# Patient Record
Sex: FEMALE | Race: BLACK OR AFRICAN AMERICAN
[De-identification: names, ages, dates, MRNs, and addresses within clinical notes are randomized per-mention and may not be internally consistent; named-entity substitution may affect disease eponyms.]

---

## 2020-03-19 ENCOUNTER — HOSPITAL ENCOUNTER (EMERGENCY)
Dept: HOSPITAL 41 - JD.ED | Age: 30
Discharge: HOME | End: 2020-03-19
Payer: COMMERCIAL

## 2020-03-19 VITALS — SYSTOLIC BLOOD PRESSURE: 129 MMHG | HEART RATE: 90 BPM | DIASTOLIC BLOOD PRESSURE: 89 MMHG

## 2020-03-19 DIAGNOSIS — J45.909: ICD-10-CM

## 2020-03-19 DIAGNOSIS — O99.89: Primary | ICD-10-CM

## 2020-03-19 DIAGNOSIS — Z79.899: ICD-10-CM

## 2020-03-19 DIAGNOSIS — Z3A.01: ICD-10-CM

## 2020-03-19 DIAGNOSIS — R10.2: ICD-10-CM

## 2020-03-19 DIAGNOSIS — M19.90: ICD-10-CM

## 2020-03-19 NOTE — US
First trimester obstetrical ultrasound: Multiple real-time images were

 obtained transvaginally.

 

Dates:

LMP: LMP given as 02/04/20, FLORINDA 11/10/20, gestational age 6 weeks 2 

days

Current ultrasound: FLORINDA 11/14/20, gestational age 5 weeks 5 days

 

Single intrauterine gestation is seen.  Amniotic fluid volume is 

normal.  Fetal pole and yolk sac are noted.  No subchorionic 

hemorrhage is seen.  Maternal ovaries are seen and appear normal.

 

Measurements:

Crown-rump length: 0.23 cm - 5 weeks 5 days

Heart rate: 106 BPM

 

Impression:

1.  Single intrauterine gestation.  Dates as noted above.

2.  No complicating process is identified by ultrasound at this time.

 

Diagnostic code #1

 

Study was dictated in MDT

## 2020-03-19 NOTE — EDM.PDOC
ED HPI GENERAL MEDICAL PROBLEM





- General


Chief Complaint: Abdominal Pain


Stated Complaint: 6WEEK PREG/ABDOMINAL/LEFT SIDE/BACK PAIN


Time Seen by Provider: 03/19/20 15:53


Source of Information: Reports: Patient


History Limitations: Reports: No Limitations





- History of Present Illness


INITIAL COMMENTS - FREE TEXT/NARRATIVE: 





Patient is a 29-year-old female who presents with suprapubic and bilateral 

lower abdominal pain that radiates into her back.  She states the symptoms have 

been present for a couple days and that they wax and wane.  She denies any 

abnormal vaginal discharge or vaginal bleeding.  Patient is currently 6 weeks 

pregnant after IVF.  She is G1, P0.  Last menstrual period was 2/4/2020.  She 

states that her bowel movements have been normal for her.  She normally goes 

every couple days and this has not changed.  Denies any dysuria.  She is had no 

nausea, vomiting, or diarrhea.  Denies any fever or chills.  Her IVF doctor was 

Dr. Singh out of [place].  She states she is supposed to have an ultrasound 

done next week and then a telehealth appointment with her.  After that time she 

will establish care with an OB/GYN in [place].


  ** Bilateral Lower Abdomen


Pain Score (Numeric/FACES): 7





- Related Data


 Allergies











Allergy/AdvReac Type Severity Reaction Status Date / Time


 


No Known Allergies Allergy   Verified 03/19/20 15:46











Home Meds: 


 Home Meds





Albuterol [IJP: Albuterol] 2.5 mg .XX Q4HR #25 unit 01/26/18 [Rx]


Mv-Mn/Iron/FA/Herbal/Digestive [Prenatal One Tablet] 1 tab PO DAILY 01/26/18 [

History]


Budesonide/Formoterol [Symbicort 160-4.5 MCG] 1 puff INH DAILY 03/19/20 [History

]


Estradiol [Joselin] 1 each TD ASDIRECTED 03/19/20 [History]


Ferrous Sulfate [Iron] 0 mg PO DAILY 03/19/20 [History]


proGESTerone [Progesterone] 50 mg IM DAILY 03/19/20 [History]











Past Medical History





- Past Health History


Medical/Surgical History: Denies Medical/Surgical History


HEENT History: Reports: None


Cardiovascular History: Reports: None


Respiratory History: Reports: Asthma


Gastrointestinal History: Reports: None


Genitourinary History: Reports: None


OB/GYN History: Reports: Pregnancy


Musculoskeletal History: Reports: Arthritis


Neurological History: Reports: Migraines


Psychiatric History: Reports: None


Endocrine/Metabolic History: Reports: None


Hematologic History: Reports: None


Immunologic History: Reports: None


Oncologic (Cancer) History: Reports: None


Dermatologic History: Reports: None





- Infectious Disease History


Infectious Disease History: Reports: None





Social & Family History





- Family History


Family Medical History: Noncontributory





- Tobacco Use


Smoking Status *Q: Never Smoker





- Caffeine Use


Caffeine Use: Reports: Soda





- Recreational Drug Use


Recreational Drug Use: No





ED ROS GENERAL





- Review of Systems


Review Of Systems: Comprehensive ROS is negative, except as noted in HPI.





ED EXAM PREGNANCY





- Physical Exam


Exam: See Below


Exam Limited By: No Limitations


General Appearance: Alert, WD/WN, No Apparent Distress


Respiratory/Chest: No Respiratory Distress, Lungs Clear, Normal Breath Sounds, 

No Accessory Muscle Use, Chest Non-Tender


Cardiovascular: Normal Peripheral Pulses, Regular Rate, Rhythm, No Edema, No 

Gallop, No JVD, No Murmur, No Rub


GI/Abdominal Exam: Normal Bowel Sounds, Soft, No Organomegaly, No Distention, 

No Abnormal Bruit, No Mass, Pelvis Stable, Tender (To bilateral lower quadrants 

and suprapubis)


Neurological: Alert, Oriented, CN II-XII Intact, Normal Cognition, Normal Gait, 

Normal Reflexes, No Motor/Sensory Deficits


Psychiatric: Normal Affect, Normal Mood


Skin Exam: Warm, Dry, Intact, Normal Color, No Rash





Course





- Vital Signs


Last Recorded V/S: 


 Last Vital Signs











Temp  97.2 F   03/19/20 15:42


 


Pulse  90   03/19/20 15:42


 


Resp  16   03/19/20 15:42


 


BP  129/89   03/19/20 15:42


 


Pulse Ox  98   03/19/20 15:42














- Orders/Labs/Meds


Labs: 


 Laboratory Tests











  03/19/20 03/19/20 03/19/20 Range/Units





  16:34 16:35 16:35 


 


WBC   7.35   (3.98-10.04)  K/mm3


 


RBC   5.04   (3.98-5.22)  M/mm3


 


Hgb   13.3   (11.2-15.7)  gm/dl


 


Hct   40.3   (34.1-44.9)  %


 


MCV   80.0   (79.4-94.8)  fl


 


MCH   26.4   (25.6-32.2)  pg


 


MCHC   33.0   (32.2-35.5)  g/dl


 


RDW Std Deviation   45.8   (36.4-46.3)  fL


 


Plt Count   433 H D   (182-369)  K/mm3


 


MPV   10.3   (9.4-12.3)  fl


 


Neut % (Auto)   57.8   (34.0-71.1)  %


 


Lymph % (Auto)   26.8   (19.3-51.7)  %


 


Mono % (Auto)   10.9   (4.7-12.5)  %


 


Eos % (Auto)   4.1   (0.7-5.8)  


 


Baso % (Auto)   0.4   (0.1-1.2)  %


 


Neut # (Auto)   4.25   (1.56-6.13)  K/mm3


 


Lymph # (Auto)   1.97   (1.18-3.74)  K/mm3


 


Mono # (Auto)   0.80 H   (0.24-0.36)  K/mm3


 


Eos # (Auto)   0.30   (0.04-0.36)  K/mm3


 


Baso # (Auto)   0.03   (0.01-0.08)  K/mm3


 


Sodium    141  (136-145)  mEq/L


 


Potassium    3.9  (3.5-5.1)  mEq/L


 


Chloride    107  ()  mEq/L


 


Carbon Dioxide    23  (21-32)  mEq/L


 


Anion Gap    14.9  (5-15)  


 


BUN    10  (7-18)  mg/dL


 


Creatinine    1.1 H  (0.55-1.02)  mg/dL


 


Est Cr Clr Drug Dosing    69.37  mL/min


 


Estimated GFR (MDRD)    > 60  (>60)  mL/min


 


BUN/Creatinine Ratio    9.1 L  (14-18)  


 


Glucose    99  ()  mg/dL


 


Calcium    9.4  (8.5-10.1)  mg/dL


 


Total Bilirubin    0.2  (0.2-1.0)  mg/dL


 


AST    8 L  (15-37)  U/L


 


ALT    17  (14-59)  U/L


 


Alkaline Phosphatase    61  ()  U/L


 


C-Reactive Protein    0.7  (<1.0)  mg/dL


 


Total Protein    7.2  (6.4-8.2)  g/dl


 


Albumin    3.4  (3.4-5.0)  g/dl


 


Globulin    3.8  gm/dL


 


Albumin/Globulin Ratio    0.9 L  (1-2)  


 


HCG, Quant    40350.0  mIU/mL


 


Urine Color  Yellow    (Yellow)  


 


Urine Appearance  Clear    (Clear)  


 


Urine pH  6.0    (5.0-8.0)  


 


Ur Specific Gravity  > or = 1.030    (1.005-1.030)  


 


Urine Protein  Negative    (Negative)  


 


Urine Glucose (UA)  Negative    (Negative)  


 


Urine Ketones  Negative    (Negative)  


 


Urine Occult Blood  Trace-intact H    (Negative)  


 


Urine Nitrite  Negative    (Negative)  


 


Urine Bilirubin  Negative    (Negative)  


 


Urine Urobilinogen  1.0    (0.2-1.0)  


 


Ur Leukocyte Esterase  Negative    (Negative)  


 


Urine RBC  5-10 H    (0-5)  /hpf


 


Urine WBC  0-5    (0-5)  /hpf


 


Ur Squamous Epith Cells  0-5    (0-5)  /hpf


 


Urine Bacteria  Few    (FEW)  /hpf


 


Urine Mucus  Few    (FEW)  /hpf














- Re-Assessments/Exams


Free Text/Narrative Re-Assessment/Exam: 





Hematology was grossly unremarkable.  hCG was elevated at 30,933 which would 

correlate with her dates.  Urinalysis was negative for any infection.  

Ultrasound was completed and shows a single, viable intrauterine gestation.  

Discussed with patient that her intermittent pain is likely related to the 

enlarging of the uterus and ligament strain.  Discharge instructions as 

documented.





Departure





- Departure


Time of Disposition: 17:53


Disposition: Home, Self-Care 01


Condition: Fair


Clinical Impression: 


 Pelvic pain during pregnancy in first trimester, antepartum








- Discharge Information


*PRESCRIPTION DRUG MONITORING PROGRAM REVIEWED*: No


*COPY OF PRESCRIPTION DRUG MONITORING REPORT IN PATIENT EMILI: No


Instructions:  Round Ligament Pain, Pelvic Pain, Female


Referrals: 


Kia Ndiaye PA-C [Primary Care Provider] - 


Yana Singh MD [Ordering Only Provider] - 


Forms:  ED Department Discharge


Additional Instructions: 


You were seen in the emergency department today for intermittent pelvic pain 

over the last few days.  Your work-up included blood work, urinalysis, and 

ultrasound of your uterus.  The results of these findings were all found to be 

normal based on your dates.  The ultrasound showed a single pregnancy in the 

uterus.  There is no signs of ectopic pregnancy.  Your urinalysis was negative 

for any signs of infection.  Your hCG was elevated and correlates with your 

dates.  As we discussed, the cause your pain could be related to the enlarging 

uterus and the surrounding ligaments and muscle tissue.  Would recommend that 

you ensure you have adequate fluid intake.  You may use Tylenol as needed for 

any discomfort.  Recommend that you call your fertility doctor tomorrow to 

discuss today's occurrences.  The ultrasound images have also been sent to 

[facility] in [place] so she should have access to these.  If you should 

experience any worsening symptoms or develop vaginal bleeding, we would 

recommend that you return to the emergency department.





Sepsis Event Note





- Evaluation


Sepsis Screening Result: No Definite Risk





- Focused Exam


Vital Signs: 


 Vital Signs











  Temp Pulse Resp BP Pulse Ox


 


 03/19/20 15:42  97.2 F  90  16  129/89  98











Date Exam was Performed: 03/19/20


Time Exam was Performed: 21:40

## 2020-11-08 ENCOUNTER — HOSPITAL ENCOUNTER (INPATIENT)
Dept: HOSPITAL 41 - JD.OB | Age: 30
LOS: 3 days | Discharge: HOME | DRG: 540 | End: 2020-11-11
Attending: OBSTETRICS & GYNECOLOGY | Admitting: OBSTETRICS & GYNECOLOGY
Payer: COMMERCIAL

## 2020-11-08 DIAGNOSIS — Z20.828: ICD-10-CM

## 2020-11-08 DIAGNOSIS — O35.8XX0: ICD-10-CM

## 2020-11-08 DIAGNOSIS — D57.3: ICD-10-CM

## 2020-11-08 DIAGNOSIS — Z3A.39: ICD-10-CM

## 2020-11-08 DIAGNOSIS — Z87.891: ICD-10-CM

## 2020-11-08 PROCEDURE — U0002 COVID-19 LAB TEST NON-CDC: HCPCS

## 2020-11-08 RX ADMIN — MISOPROSTOL PRN MCG: 100 TABLET ORAL at 16:33

## 2020-11-08 RX ADMIN — MISOPROSTOL PRN MCG: 100 TABLET ORAL at 12:20

## 2020-11-08 RX ADMIN — MISOPROSTOL PRN MCG: 100 TABLET ORAL at 08:24

## 2020-11-08 NOTE — PCM.PNLD
Labor Progress Note





- VS & Meds


Vital Signs: 


                                Last Vital Signs











Temp  36.8 C   11/08/20 07:27


 


Pulse  102 H  11/08/20 07:27


 


Resp  15   11/08/20 07:27


 


BP  122/84   11/08/20 07:27


 


Pulse Ox      











Active Medications: 


                               Current Medications





Ampicillin Sodium 2 gm/ Sodium (Chloride)  100 mls @ 200 mls/hr IV ONETIME ONE


   Stop: 11/08/20 23:29


Ampicillin Sodium 1 gm/ Sodium (Chloride)  100 mls @ 200 mls/hr IV Q4H SPENCER


Oxytocin/Lactated Ringer's (Pitocin In Lr 10 Units/1,000 Ml)  10 unit in 1,000 

mls @ 100 mls/hr IV .CONTINUOUS SPENCER


Lactated Ringer's (Ringers, Lactated)  1,000 mls @ 100 mls/hr IV ASDIRECTED SPENCER


Oxytocin/Lactated Ringer's (Pitocin In Lr 10 Units/1,000 Ml)  10 unit in 1,000 

mls @ 12 mls/hr IV TITRATE SPENCER; Protocol


Nalbuphine HCl (Nubain)  10 mg IVPUSH Q2H PRN


   PRN Reason: Pain


Ondansetron HCl (Zofran)  4 mg IVPUSH Q4H PRN


   PRN Reason: Nausea/Vomiting


Sodium Chloride (Saline Flush)  10 ml FLUSH ASDIRECTED PRN


   PRN Reason: Keep Vein Open





Discontinued Medications





Misoprostol (Cytotec)  25 mcg VAG Q4H PRN


   PRN Reason: cervical ripening


   Last Admin: 11/08/20 16:33 Dose:  25 mcg


   Documented by: 


Misoprostol (Cytotec) Confirm Administered Dose 25 mcg .ROUTE .STK-MED ONE


   Stop: 11/08/20 19:42











- Uterine Contractions


Uterine Monitoring Mode: External [place]


Contraction Intensity: Mild to Moderate


Uterine Resting Tone: Soft





- Fetal Monitoring


Fetal Monitor Mode: External Ultrasound


Fetal Heart Rate (FHR) Baseline: 135


Fetal Heart Rate (FHR) Variability: Moderate (6-25 bmp)


Fetal Accelerations: Present, 15x15


Fetal Decelerations: None


Fetal Strip Review: Category I





- Vaginal Exam


Dilation (cm): 2


Effacement (Percent): 50-60


Station: -2


Cervical Position: Midposition





- Labor Progress (Free Text)


Labor Progress: 





Doing well.  Has received 3 doses of cytotec.  Notes contractions as a 5/10.  

Just in last hour have become more regular.  Has made some nice change in 

cervix.  Would like to do another cytotec if possible, however will wait about 

45 minutes or so to see if current pattern lasts.  If does will start pitocin.  

If contractions fade will place cytotec.  Patient and wife agree.

## 2020-11-08 NOTE — PCM.LDHP
L&D History of Present Illness





- General


Date of Service: 20


Admit Problem/Dx: 


                   Patient Status Order with Admit Dx/Problem





20 07:27


Patient Status [ADT] Routine 








                           Admission Diagnosis/Problem











Admission Diagnosis/Problem    Normal pregnancy in third trimester














Source of Information: Patient


History Limitations: Reports: No Limitations





- History of Present Illness


Introduction:: 


Patient is a 30 y/o  at 39 2/7 wks who presents for IOL.  Done for fetal 

pyelectasis and IVF pregnancy per [facility] recommendations.  Doing well today.  No 

contractions or signs of labor.  





- Related Data


Allergies/Adverse Reactions: 


                                    Allergies











Allergy/AdvReac Type Severity Reaction Status Date / Time


 


No Known Allergies Allergy   Verified 20 15:46











Home Medications: 


                                    Home Meds





Albuterol [IJP: Albuterol] 2.5 mg .XX Q4HR #25 unit 18 [Rx]


Mv-Mn/Iron/FA/Herbal/Digestive [Prenatal One Tablet] 1 tab PO DAILY 18 

[History]


Budesonide/Formoterol [Symbicort 160-4.5 MCG] 1 puff INH DAILY 20 

[History]


Ferrous Sulfate [Iron] 0 mg PO DAILY 20 [History]


estradioL [Joselin] 1 each TD ASDIRECTED 20 [History]


proGESTerone [Progesterone] 50 mg IM DAILY 20 [History]











Past Medical History


Respiratory History: Reports: Asthma


OB/GYN History: Reports: Pregnancy


: 1


Para: 0


LMP (Approximate): Pregnant


Musculoskeletal History: Reports: Arthritis


Neurological History: Reports: Migraines


Hematologic History: Reports: Other (See Below) (Sickle cell trait)





- Past Surgical History


Other Surgical History Comment: No past surgical history





Social & Family History





- Family History


Family Medical History: Noncontributory





- Tobacco Use


Tobacco Use Status *Q: Former Tobacco User





- Caffeine Use


Caffeine Use: Reports: Soda





- Alcohol Use


Alcohol Use History: No





- Recreational Drug Use


Recreational Drug Use: No





H&P Review of Systems





- Review of Systems:


Review Of Systems: See Below


General: Reports: No Symptoms


Pulmonary: Reports: No Symptoms


Cardiovascular: Reports: No Symptoms


Gastrointestinal: Reports: No Symptoms


Genitourinary: Reports: No Symptoms


Musculoskeletal: Reports: No Symptoms


Psychiatric: Reports: No Symptoms


Neurological: Reports: No Symptoms





L&D Exam





- Exam


Exam: See Below





- OB Specific


Contraction Intensity: Irritability


Fetal Movement: Active


Fetal Heart Tones: Present


Fetal Heart Tones per Min: 130


Fetal Heart Rate (FHR) Variability: Moderate (6-25 bmp)


Birth Presentation: Vertex





- Cross Score


Cross Score Cervix Position: Posterior


Cross Score Consistency: Medium


Cross Score Effacement: 31-50%


Cross Score Dilation: 1-2 cm


Cross Score Infant's Station: -2


Cross Score Total: 4





- Exam


General: Alert, Oriented, Cooperative


Lungs: Clear to Auscultation, Normal Respiratory Effort


Cardiovascular: Regular Rate, Regular Rhythm


GI/Abdominal Exam: Soft, Non-Tender


Genitourinary: Normal external exam


Extremities: Normal Inspection


Skin: Warm, Dry, Intact





- Patient Data


Result Diagrams: 


                                 20 07:45








- Problem List


(1) 39 weeks gestation of pregnancy


SNOMED Code(s): 17775518


   ICD Code: Z3A.39 - 39 WEEKS GESTATION OF PREGNANCY   Status: Acute   Current 

Visit: Yes   





(2) Sickle cell trait


SNOMED Code(s): 06068710


   ICD Code: D57.3 - SICKLE-CELL TRAIT   Status: Acute   Current Visit: Yes   





(3) GBS (group B Streptococcus carrier), +RV culture, currently pregnant


SNOMED Code(s): 6143880402964, 119686571, 5819307591485


   ICD Code: O99.820 - STREPTOCOCCUS B CARRIER STATE COMPLICATING PREGNANCY   

Status: Acute   Current Visit: Yes   





(4) Fetal pyelectasis


SNOMED Code(s): 762689186


   ICD Code: ESL9293 -    Status: Acute   Current Visit: Yes   


Problem List Initiated/Reviewed/Updated: Yes


Orders Last 24hrs: 


                               Active Orders 24 hr











 Category Date Time Status


 


 Patient Status [ADT] Routine ADT  20 07:27 Ordered


 


 Activity as Tolerated [RC] PFP Care  20 07:27 Ordered


 


 Communication Order [RC] ASDIRECTED Care  20 07:27 Ordered


 


 Communication Order [RC] ASDIRECTED Care  20 07:30 Ordered


 


 Fetal Heart Tones [RC] ASDIRECTED Care  20 07:28 Ordered


 


 Fetal Monitoring [RC] INTERMITTENT Care  20 07:30 Ordered


 


 Fetal Non Stress Test [RC] PER UNIT ROUTINE Care  20 07:27 Ordered


 


 Notify Provider [RC] ASDIRECTED Care  20 07:30 Ordered


 


 Notify Provider [RC] PFP Care  20 07:27 Ordered


 


 Notify Provider [RC] PRN Care  20 07:27 Ordered


 


 Peripheral IV Care [RC] .AS DIRECTED Care  20 07:28 Ordered


 


 Vital Signs [RC] PER UNIT ROUTINE Care  20 07:27 Ordered


 


 Regular Diet [DIET] Diet  20 Breakfast Ordered


 


 CBC W/O DIFF,HEMOGRAM [HEME] Routine Lab  20 07:27 Ordered


 


 CORONAVIRUS COVID-19 MARGOT [MOLEC] Stat Lab  20 07:30 Ordered


 


 RAPID PLASMA REAGIN,RPR [CHEM] Routine Lab  20 07:27 Ordered


 


 TYPE AND SCREEN [BBK] Routine Lab  20 07:27 Ordered


 


 Ampicillin 1 gm Med  20 03:00 Ordered





 Sodium Chloride 0.9% [Normal Saline] 100 ml   





 IV Q4H   


 


 Ampicillin 2 gm Med  20 23:00 Ordered





 Sodium Chloride 0.9% [Normal Saline] 100 ml   





 IV ONETIME   


 


 Lactated Ringers [Ringers, Lactated] 1,000 ml Med  20 07:30 Ordered





 IV ASDIRECTED   


 


 Nalbuphine [Nubain] Med  20 07:27 Ordered





 10 mg IVPUSH Q2H PRN   


 


 Ondansetron [Zofran] Med  20 07:27 Ordered





 4 mg IVPUSH Q4H PRN   


 


 Oxytocin/Lactated Ringers [Pitocin in LR 10 Units/1,000 Med  20 07:30 

Ordered





 ML]   





 10 unit in 1,000 ml IV .CONTINUOUS   


 


 Oxytocin/Lactated Ringers [Pitocin in LR 10 Units/1,000 Med  20 07:30 

Ordered





 ML]   





 10 unit in 1,000 ml IV TITRATE   


 


 Sodium Chloride 0.9% [Saline Flush] Med  20 07:27 Ordered





 10 ml FLUSH ASDIRECTED PRN   


 


 miSOPROStoL [Cytotec] Med  20 07:30 Ordered





 25 mcg VAG Q4H PRN   


 


 Electronic Fetal Heart Tones Ext w TOCO [WOMSER] Oth  20 07:27 Ordered





 Routine   


 


 Electronic Fetal Heart Tones Internal [WOMSER] Per Unit Oth  20 07:27 

Ordered





 Routine   


 


 Medication Administration Instruction [OM.PC] Oth  20 07:45 Ordered





 ASDIRECTED   


 


 Peripheral IV Insertion Adult [OM.PC] Routine Oth  20 07:27 Ordered


 


 Resuscitation Status Routine Resus Stat  20 07:27 Ordered








                                Medication Orders





Ampicillin Sodium 2 gm/ Sodium (Chloride)  100 mls @ 200 mls/hr IV ONETIME ONE


   Stop: 20 23:29


Ampicillin Sodium 1 gm/ Sodium (Chloride)  100 mls @ 200 mls/hr IV Q4H SPENCER


Oxytocin/Lactated Ringer's (Pitocin In Lr 10 Units/1,000 Ml)  10 unit in 1,000 

mls @ 100 mls/hr IV .CONTINUOUS SPENCER


Lactated Ringer's (Ringers, Lactated)  1,000 mls @ 100 mls/hr IV ASDIRECTED SPENCER


Oxytocin/Lactated Ringer's (Pitocin In Lr 10 Units/1,000 Ml)  10 unit in 1,000 

mls @ 12 mls/hr IV TITRATE SPENCER; Protocol


Misoprostol (Cytotec)  25 mcg VAG Q4H PRN


   PRN Reason: cervical ripening


Nalbuphine HCl (Nubain)  10 mg IVPUSH Q2H PRN


   PRN Reason: Pain


Ondansetron HCl (Zofran)  4 mg IVPUSH Q4H PRN


   PRN Reason: Nausea/Vomiting


Sodium Chloride (Saline Flush)  10 ml FLUSH ASDIRECTED PRN


   PRN Reason: Keep Vein Open








Assessment/Plan Comment:: 





* Labs to be done


* Cytotec for IOL.  Will switch to pitocin/AROM when able.  


* GBS positive, will start antibiotics when in more active labor pattern 


* Pain management per patient preference 


* Anticipate 


* Will made Peds team aware of findings of prenatal fetal pyelectasis and 

  recommendations for renal US at 1-2 weeks of life

## 2020-11-09 PROCEDURE — 3E0P7VZ INTRODUCTION OF HORMONE INTO FEMALE REPRODUCTIVE, VIA NATURAL OR ARTIFICIAL OPENING: ICD-10-PCS | Performed by: OBSTETRICS & GYNECOLOGY

## 2020-11-09 PROCEDURE — 10H07YZ INSERTION OF OTHER DEVICE INTO PRODUCTS OF CONCEPTION, VIA NATURAL OR ARTIFICIAL OPENING: ICD-10-PCS | Performed by: OBSTETRICS & GYNECOLOGY

## 2020-11-09 RX ADMIN — KETOROLAC TROMETHAMINE SCH MG: 30 INJECTION, SOLUTION INTRAMUSCULAR at 12:31

## 2020-11-09 RX ADMIN — KETOROLAC TROMETHAMINE SCH MG: 30 INJECTION, SOLUTION INTRAMUSCULAR at 18:28

## 2020-11-09 NOTE — PCM.SN.2
- Free Text/Narrative


Note: 





0500


Last in to see patient around 0115.  Deceleration with maternal vomiting and 

SROM.  This resolved with position changes and discontinuation of pitocin which 

had been at 4.  Decision made to not restart and allow patient to continue to 

labor on own. 





Received call at 0416 that patient with some late decelerations since completion

of epidural.  Now becoming more frequent and deeper.  BP's have been normal and 

not required treatment.  Changes not responding to position changes.  Asked RN 

to put patient into hands/knees and then presented to L&D.  By my arrival 

decelerations had mostly stopped.  After being in that position for about 20 

minutes turned her back to supine and placed IUPC and FSE.  Positioned then in 

lateral tilt.  Reviewed with family potential that baby would not tolerate labor

process. Reviewed risks/benefits of  if needed.  They expressed 

understanding. 





Adamaris Ham MD

## 2020-11-09 NOTE — PCM.OPNOTE
- General Post-Op/Procedure Note


Date of Surgery/Procedure: 20


Operative Procedure(s): Primary low transverse 


Findings: 


Baby boy in a vertex presentation.  Weight of 6 lbs 9 oz.  APGARS of 9 & 9.  

Normal appearance of the uterus, fallopian tubes, and ovaries. 


Pre Op Diagnosis: 39 3/7 wks.  Fetal pyelectasis.  NRFS - recurrent late 

decelerations


Post-Op Diagnosis: Same


Anesthesia Technique: Epidural


Primary Surgeon: Adamaris Ham


Secondary Surgeon: Brandy Mckenna


Anesthesia Provider: Bud Messer


Reason Assistant Was Necessary: 


Speed, safety of procedure. 


Pathology: 


Cord blood collected.  Placenta discarded. 


Fluid Replacement, Intraop: 1,400


Output, Urine Amount: 75


EBL in mLs: 900


Complications: None


Condition: Good


Free Text/Narrative:: 





The risks, benefits, indications, potential complications, and alternatives were

explained to the patient and informed consent obtained.





After induction of anesthesia, the patient was placed in a supine position and 

then draped and prepped in the usual sterile manner.  A Pfannenstiel incision 

was made and carried down through the subcutaneous tissue to the fascia.  

Fascial incision was made and extended transversely.  The fascia was  

from the underlying rectus tissue superiorly and inferiorly.  The peritoneum was

identified and entered.  Peritoneal incision was extended longitudinally. The 

utero-vesical peritoneal reflection was incised transversely and the bladder 

flap was bluntly freed from the lower uterine segment.  A low transverse uterine

incision was made sharply with a scalpel and extended bluntly in a cephalocaudad

direction.  A baby boy was delivered from a vertex presentation with APGARS as 

above.  After the umbilical cord was clamped and cut cord blood was obtained for

evaluation.  The placenta was removed intact and appeared normal.  The uterus 

was exteriorized and cleared of clots.  The uterine outline, tubes and ovaries 

appeared normal.  The uterine incision was closed with running locked sutures of

0 Vicryl.  Hemostasis was obtained with a second imbricating layer of 0 Vicryl. 

The uterus was then placed back into the abdomen. The infracolic gutters were 

cleared of blood clots.  





The fascia was then reapproximated with running sutures of 0 Vicryl.  The 

subcutaneous tissue was irrigated with sterile warm normal saline, hemostasis 

obtained with cautery.  This layer was also closed with a running 0 Vicryl.  The

skin was reapproximated with running  Subcuticular 4-0 monocryl sutures.   

Instrument, sponge, and needle counts were correct prior the abdominal closure 

and at the conclusion of the case.

## 2020-11-09 NOTE — PCM.PREANE
Preanesthetic Assessment





- Procedure


Proposed Procedure: 





Epidural








- Anesthesia/Transfusion/Family Hx


Anesthesia History: Prior Anesthesia Without Reaction


Family History of Anesthesia Reaction: No


Transfusion History: No Prior Transfusion(s)





- Review of Systems


General: Fatigue


Pulmonary: No Symptoms


Cardiovascular: No Symptoms


Gastrointestinal: Abdominal Pain (labor)


Neurological: No Symptoms


Other: Reports: None





- Physical Assessment


Vital Signs: 





                                Last Vital Signs











Temp  36.8 C   11/08/20 07:27


 


Pulse  102 H  11/08/20 07:27


 


Resp  15   11/08/20 07:27


 


BP  122/84   11/08/20 07:27


 


Pulse Ox      











Height: 1.68 m


Weight: 82.1 kg


ASA Class: 2


Mental Status: Alert & Oriented x3


Airway Class: Mallampati = 1


Dentition: Reports: Normal Dentition


Thyro-Mental Finger Breadths: 3


Mouth Opening Finger Breadths: 3


ROM/Head Extension: Full


Lungs: Clear to Auscultation, Normal Respiratory Effort


Cardiovascular: Regular Rate, Regular Rhythm





- Lab


Values: 





                             Laboratory Last Values











WBC  8.95 K/mm3 (3.98-10.04)   11/08/20  07:45    


 


RBC  4.92 M/mm3 (3.98-5.22)   11/08/20  07:45    


 


Hgb  12.3 gm/dl (11.2-15.7)   11/08/20  07:45    


 


Hct  37.7 % (34.1-44.9)   11/08/20  07:45    


 


MCV  76.6 fl (79.4-94.8)  L D 11/08/20  07:45    


 


MCH  25.0 pg (25.6-32.2)  L  11/08/20  07:45    


 


MCHC  32.6 g/dl (32.2-35.5)   11/08/20  07:45    


 


RDW Std Deviation  39.9 fL (36.4-46.3)   11/08/20  07:45    


 


Plt Count  295 K/mm3 (182-369)  D 11/08/20  07:45    


 


MPV  12.7 fl (9.4-12.3)  H  11/08/20  07:45    


 


SARS-CoV-2 RNA (MARGOT)  Negative  (NEGATIVE)   11/08/20  07:45    


 


Blood Type  O POSITIVE   11/08/20  07:45    


 


Gel Antibody Screen  Negative   11/08/20  07:45    














- Allergies


Allergies/Adverse Reactions: 


                                    Allergies











Allergy/AdvReac Type Severity Reaction Status Date / Time


 


No Known Allergies Allergy   Verified 03/19/20 15:46














- Anesthesia Plan


Pre-Op Medication Ordered: None





- Acknowledgements


Anesthesia Type Planned: Epidural


Pt an Appropriate Candidate for the Planned Anesthesia: Yes


Alternatives and Risks of Anesthesia Discussed w Pt/Guardian: Yes


Pt/Guardian Understands and Agrees with Anesthesia Plan: Yes





PreAnesthesia Questionnaire





- Past Health History


Medical/Surgical History: Denies Medical/Surgical History


HEENT History: Reports: Allergic Rhinitis


Cardiovascular History: Reports: None


Respiratory History: Reports: Asthma


Gastrointestinal History: Reports: None, GERD


Genitourinary History: Reports: None


OB/GYN History: Reports: Pregnancy


Musculoskeletal History: Reports: Arthritis


Neurological History: Reports: Migraines


Psychiatric History: Reports: None


Endocrine/Metabolic History: Reports: None


Hematologic History: Reports: Other (See Below) (Sickle cell trait)


Immunologic History: Reports: None


Oncologic (Cancer) History: Reports: None


Dermatologic History: Reports: None





- Infectious Disease History


Infectious Disease History: Reports: None





- Past Surgical History


Other Surgical History Comment: No past surgical history





- SUBSTANCE USE


Tobacco Use Status *Q: Former Tobacco User


Tobacco Use Within Last Twelve Months: Cigarettes


Second Hand Smoke Exposure: No


Recreational Drug Use History: No





- HOME MEDS


Home Medications: 


                                    Home Meds





Albuterol [IJP: Albuterol] 2.5 mg .XX Q4HR #25 unit 01/26/18 [Rx]


Budesonide/Formoterol [Symbicort 160-4.5 MCG] 1 puff INH DAILY 03/19/20 

[History]


Magnesium 200 mg PO DAILY 11/08/20 [History]


Montelukast [Singulair] 10 mg PO DAILY 11/08/20 [History]


Omeprazole 20 mg PO DAILY 11/08/20 [History]


Prenatal #103/Iron Fumarate/Fa [ Prenatal] 1 tab PO DAILY 11/08/20 

[History]


Vit D3 & K/Berberine HCl/Hops [Ostera] 1 each PO DAILY 11/08/20 [History]











- CURRENT (IN HOUSE) MEDS


Current Meds: 





                               Current Medications





Diphenhydramine HCl (Benadryl)  25 mg IVPUSH Q6H PRN


   PRN Reason: pruritis


Ephedrine Sulfate (Ephedrine Sulfate)  5 mg IVPUSH ASDIRECTED PRN


   PRN Reason: Hypotension


Fentanyl (Sublimaze)  100 mcg EPIDUR Q3H PRN


   PRN Reason: Pain


Fentanyl/Bupivacaine HCl (Fentanyl/Bupivacaine/Ns 2 Mcg-0.125% 100 Ml)  100 ml 

EPIDUR ASDIRECTED PRN


   PRN Reason: Pain


Ampicillin Sodium 1 gm/ Sodium (Chloride)  100 mls @ 200 mls/hr IV Q4H [facility]


Oxytocin/Lactated Ringer's (Pitocin In Lr 10 Units/1,000 Ml)  10 unit in 1,000 

mls @ 100 mls/hr IV .CONTINUOUS SPENCER


Lactated Ringer's (Ringers, Lactated)  1,000 mls @ 100 mls/hr IV ASDIRECTED SP[facility]ER


   Last Admin: 11/09/20 02:15 Dose:  999 mls/hr


   Documented by: 


Oxytocin/Lactated Ringer's (Pitocin In Lr 10 Units/1,000 Ml)  10 unit in 1,000 

mls @ 12 mls/hr IV TITRATE SPENCER; Protocol


   Last Titration: 11/09/20 00:05 Dose:  4 munits/min, 24 mls/hr


   Documented by: 


Nalbuphine HCl (Nubain)  10 mg IVPUSH Q2H PRN


   PRN Reason: Pain


Ondansetron HCl (Zofran)  4 mg IVPUSH Q4H PRN


   PRN Reason: Nausea/Vomiting


Sodium Chloride (Saline Flush)  10 ml FLUSH ASDIRECTED PRN


   PRN Reason: Keep Vein Open





Discontinued Medications





Fentanyl (Sublimaze) Confirm Administered Dose 100 mcg .ROUTE .STK-MED ONE


   Stop: 11/09/20 02:19


Ampicillin Sodium 2 gm/ Sodium (Chloride)  100 mls @ 200 mls/hr IV ONETIME ONE


   Stop: 11/08/20 23:29


   Last Admin: 11/08/20 23:19 Dose:  200 mls/hr


   Documented by: 


Misoprostol (Cytotec)  25 mcg VAG Q4H PRN


   PRN Reason: cervical ripening


   Last Admin: 11/08/20 16:33 Dose:  25 mcg


   Documented by: 


Misoprostol (Cytotec) Confirm Administered Dose 25 mcg .ROUTE .STK-MED ONE


   Stop: 11/08/20 19:42


   Last Admin: 11/08/20 23:18 Dose:  Not Given


   Documented by:

## 2020-11-10 RX ADMIN — OXYCODONE HYDROCHLORIDE AND ACETAMINOPHEN PRN TAB: 5; 325 TABLET ORAL at 23:31

## 2020-11-10 RX ADMIN — OXYCODONE HYDROCHLORIDE AND ACETAMINOPHEN PRN TAB: 5; 325 TABLET ORAL at 14:51

## 2020-11-10 RX ADMIN — KETOROLAC TROMETHAMINE SCH MG: 30 INJECTION, SOLUTION INTRAMUSCULAR at 01:48

## 2020-11-10 NOTE — PCM.PNPP
- General Info


Date of Service: 11/10/20


Functional Status: Reports: Pain Controlled, Tolerating Diet, Ambulating, 

Urinating





- Review of Systems


General: Reports: No Symptoms


Pulmonary: Reports: No Symptoms


Cardiovascular: Reports: No Symptoms


Gastrointestinal: Reports: Abdominal Pain (managed)


Genitourinary: Reports: No Symptoms


Musculoskeletal: Reports: No Symptoms


Neurological: Reports: No Symptoms





- Patient Data


Vital Signs - Most Recent: 


                                Last Vital Signs











Temp  36.7 C   11/10/20 03:50


 


Pulse  91   11/10/20 03:50


 


Resp  14   11/10/20 03:50


 


BP  128/76   11/10/20 03:50


 


Pulse Ox  99   11/10/20 03:50











Weight - Most Recent: 82.1 kg


I&O - Last 24 Hours: 


                                 Intake & Output











 11/09/20 11/09/20 11/10/20





 14:59 22:59 06:59


 


Intake Total 7220  


 


Output Total 1325 1700 1000


 


Balance 5895 -1700 -1000











Lab Results - Last 24 Hours: 


                         Laboratory Results - last 24 hr











  20 Range/Units





  07:45 


 


RPR  Non-reactive  (NONREACTIVE)  











Med Orders - Current: 


                               Current Medications





Diphenhydramine HCl (Benadryl)  25 mg IVPUSH Q6H PRN


   PRN Reason: Itching or Nausea


Docusate Sodium (Colace)  100 mg PO Q12H PRN


   PRN Reason: Constipation


Ibuprofen (Motrin)  600 mg PO Q6H PRN


   PRN Reason: mild pain or fever


Ondansetron HCl (Zofran)  4 mg IV Q8H PRN


   PRN Reason: Nausea/Vomiting


   Last Admin: 20 11:52 Dose:  4 mg


   Documented by: 


Oxycodone/Acetaminophen (Percocet 325-5 Mg)  1 tab PO Q4H PRN


   PRN Reason: Pain (moderate 4-6)


Oxycodone/Acetaminophen (Percocet 325-5 Mg)  2 tab PO Q4H PRN


   PRN Reason: Pain (severe 7-10)





Discontinued Medications





Bupivacaine HCl (Sensorcaine-Mpf 0.25%)  10 ml .ROUTE .STK-MED ONE


   Stop: 20 08:01


Cefazolin Sodium (Ancef) Confirm Administered Dose 2 gm .ROUTE .STK-MED ONE


   Stop: 20 05:52


Citric Acid/Sodium Citrate (Bicitra Solution)  30 ml PO ONETIME ONE


   Stop: 20 05:17


   Last Admin: 20 05:27 Dose:  30 ml


   Documented by: 


Citric Acid/Sodium Citrate (Bicitra Solution) Confirm Administered Dose 30 ml 

.ROUTE .STK-MED ONE


   Stop: 20 05:22


   Last Admin: 20 05:27 Dose:  Not Given


   Documented by: 


Diphenhydramine HCl (Benadryl)  25 mg IVPUSH Q6H PRN


   PRN Reason: pruritis


Ephedrine Sulfate (Ephedrine Sulfate)  5 mg IVPUSH ASDIRECTED PRN


   PRN Reason: Hypotension


Fentanyl (Sublimaze)  100 mcg EPIDUR Q3H PRN


   PRN Reason: Pain


   Last Admin: 20 03:29 Dose:  100 mcg


   Documented by: 


Fentanyl (Sublimaze) Confirm Administered Dose 100 mcg .ROUTE .STK-MED ONE


   Stop: 20 02:19


   Last Admin: 20 03:29 Dose:  Not Given


   Documented by: 


Fentanyl/Bupivacaine HCl (Fentanyl/Bupivacaine/Ns 2 Mcg-0.125% 100 Ml)  100 ml 

EPIDUR ASDIRECTED PRN


   PRN Reason: Pain


   Last Admin: 20 03:29 Dose:  100 ml


   Documented by: 


Ampicillin Sodium 2 gm/ Sodium (Chloride)  100 mls @ 200 mls/hr IV ONETIME ONE


   Stop: 20 23:29


   Last Admin: 20 23:19 Dose:  200 mls/hr


   Documented by: 


Ampicillin Sodium 1 gm/ Sodium (Chloride)  100 mls @ 200 mls/hr IV Q4H [facility]


   Last Admin: 20 03:28 Dose:  200 mls/hr


   Documented by: 


Oxytocin/Lactated Ringer's (Pitocin In Lr 10 Units/1,000 Ml)  10 unit in 1,000 

mls @ 100 mls/hr IV .CONTINUOUS SPENCER


Lactated Ringer's (Ringers, Lactated)  1,000 mls @ 100 mls/hr IV ASDIRECTED SPENCER


   Last Admin: 20 05:05 Dose:  100 mls/hr


   Documented by: 


Oxytocin/Lactated Ringer's (Pitocin In Lr 10 Units/1,000 Ml)  10 unit in 1,000 

mls @ 12 mls/hr IV TITRATE SPENCER; Protocol


   Last Titration: 20 01:17 Dose:  0 munits/min, 0 mls/hr


   Documented by: 


Cefazolin Sodium/Dextrose 2 gm (/ Premix)  50 mls @ 100 mls/hr IV ONETIME ONE


   Stop: 20 05:45


Azithromycin 500 mg/ Sodium (Chloride)  250 mls @ 250 mls/hr IV ONETIME ONE


   Stop: 20 06:15


   Last Admin: 20 05:30 Dose:  250 mls/hr


   Documented by: 


Lactated Ringer's (Ringers, Lactated) Confirm Administered Dose 1,000 mls @ as 

directed .ROUTE .STK-MED ONE


   Stop: 20 05:38


Dextrose/Lactated Ringer's (Dextrose 5%-Lactated Ringers)  1,000 mls @ 125 

mls/hr IV ASDIRECTED SPENCER


   Stop: 20 16:40


   Last Admin: 20 09:20 Dose:  125 mls/hr


   Documented by: 


Ketorolac Tromethamine (Toradol) Confirm Administered Dose 30 mg .ROUTE .STK-MED

ONE


   Stop: 20 06:11


Ketorolac Tromethamine (Toradol)  30 mg IVPUSH Q6H [facility]


   Stop: 11/10/20 00:31


   Last Admin: 11/10/20 01:48 Dose:  30 mg


   Documented by: 


Lidocaine/Epinephrine (Xylocaine-Mpf 2%-Epi 1:200,000) Confirm Administered Dose

20 ml .ROUTE .STK-MED ONE


   Stop: 20 05:37


Metoclopramide HCl (Reglan)  10 mg IVPUSH ONETIME ONE


   Stop: 20 05:17


   Last Admin: 20 05:27 Dose:  10 mg


   Documented by: 


Metoclopramide HCl (Reglan) Confirm Administered Dose 10 mg .ROUTE .STK-MED ONE


   Stop: 20 05:22


   Last Admin: 20 05:27 Dose:  Not Given


   Documented by: 


Miscellaneous Medication (Phenylephrine 1 Mg/10 Ml-Ns) Confirm Administered Dose

1 mg .ROUTE .STK-MED ONE


   Stop: 20 06:07


Misoprostol (Cytotec)  25 mcg VAG Q4H PRN


   PRN Reason: cervical ripening


   Last Admin: 20 16:33 Dose:  25 mcg


   Documented by: 


Misoprostol (Cytotec) Confirm Administered Dose 25 mcg .ROUTE .STK-MED ONE


   Stop: 20 19:42


   Last Admin: 20 23:18 Dose:  Not Given


   Documented by: 


Morphine Sulfate (Duramorph Pf) Confirm Administered Dose 10 mg .ROUTE .STK-MED 

ONE


   Stop: 20 06:13


Nalbuphine HCl (Nubain)  10 mg IVPUSH Q2H PRN


   PRN Reason: Pain


Ondansetron HCl (Zofran)  4 mg IVPUSH Q4H PRN


   PRN Reason: Nausea/Vomiting


Oxytocin (Pitocin) Confirm Administered Dose 10 unit .ROUTE .STK-MED ONE


   Stop: 20 06:04


Sodium Chloride (Saline Flush)  10 ml FLUSH ASDIRECTED PRN


   PRN Reason: Keep Vein Open


   Last Admin: 20 18:30 Dose:  10 ml


   Documented by: 











- Infant Interaction


Infant Disposition, Postpartum:  in Room with Family


Infant Interaction: Holding Infant


Infant Feeding:  Infant; Nursed Well


Support Person: Other (see below)





- Postpartum Recovery Exam


Fundal Tone: Firm


Fundal Level: 1 Fingerbreadths Below Umbilicus


Fundal Placement: Midline


Lochia Amount: Scant


Lochia Color: Rubra/Red


Perineum Description: Intact, Minimal Bruising/Swelling


Episiotomy/Laceration: None


Bladder Status: Voiding


Urinary Elimination: Voided





- Exam


General: Alert


Physical Findings Comment:: 





Exam to be done later.  Patient currently breast feeding 





- Problem List & Annotations


(1) 39 weeks gestation of pregnancy


SNOMED Code(s): 49826906


   Code(s): Z3A.39 - 39 WEEKS GESTATION OF PREGNANCY   Status: Acute   Current 

Visit: Yes   





(2) Sickle cell trait


SNOMED Code(s): 10946065


   Code(s): D57.3 - SICKLE-CELL TRAIT   Status: Acute   Current Visit: Yes   





(3) GBS (group B Streptococcus carrier), +RV culture, currently pregnant


SNOMED Code(s): 6924933121623, 353664573, 6434418840654


   Code(s): O99.820 - STREPTOCOCCUS B CARRIER STATE COMPLICATING PREGNANCY   

Status: Acute   Current Visit: Yes   





(4) Fetal pyelectasis


SNOMED Code(s): 972432104


   Code(s): NKN8921 -    Status: Acute   Current Visit: Yes   





(5) Non-reassuring fetal status


SNOMED Code(s): 722818719


   Code(s): SRG1169 -    Status: Acute   Current Visit: Yes   





(6) S/P primary low transverse 


SNOMED Code(s): 550640792, 97381300, 991661056, 130403939, 890736669


   Code(s): Z98.891 - HISTORY OF UTERINE SCAR FROM PREVIOUS SURGERY   Status: 

Acute   Current Visit: Yes   





- Problem List Review


Problem List Initiated/Reviewed/Updated: Yes





- My Orders


Last 24 Hours: 


My Active Orders





20 Breakfast


Regular Diet [DIET] 





20 08:41


Acetaminophen/oxyCODONE [Percocet 325-5 MG]   1 tab PO Q4H PRN 


Acetaminophen/oxyCODONE [Percocet 325-5 MG]   2 tab PO Q4H PRN 


Docusate Sodium [Colace]   100 mg PO Q12H PRN 


Ondansetron [Zofran]   4 mg IV Q8H PRN 


diphenhydrAMINE [Benadryl]   25 mg IVPUSH Q6H PRN 





20 08:41


Antiembolic Devices [RC] QSHIFT 


Communication Order [RC] PER UNIT ROUTINE 


Intake and Output [RC] ASDIRECTED 


May Shower [RC] PER UNIT ROUTINE 


Notify Provider Intake and Out [RC] ASDIRECTED 


RT Incentive Spirometry [RC] Q2HWA 


Vital Signs [RC] 03,09,15,21 


Assess Lochia [WOMSER] Per Unit Routine 


Assess Uterine Involution [WOMSER] Per Unit Routine 


Breast Pump [WOMSER] Routine 


Heat Therapy [OM.PC] Per Unit Routine 


Peripheral IV Discontinue [OM.PC] Routine 


Sequential Compression Device [OM.PC] Per Unit Routine 





11/10/20 05:11


CBC W/O DIFF,HEMOGRAM [HEME] AM 





11/10/20 06:30


Ibuprofen [Motrin]   600 mg PO Q6H PRN 














- Assessment


Assessment:: 





POD#1





- Plan


Plan:: 





* Routine care


* Breast feeding


* Discharge home in 1-2 days

## 2020-11-10 NOTE — [id number]
Post Anesthesia Note





- EVALUATION WITHIN 48HRS OF ANESTHETIC


Vital Signs in Normal Range: Yes


Patient Participated in Evaluation: Yes


Respiratory Function Stable: Yes


Airway Patent: Yes


Cardiovascular Function Stable: Yes


Hydration Status Stable: Yes


Pain Control Satisfactory: Yes


Nausea and Vomiting Control Satisfactory: Yes


Mental Status Recovered: Yes


Vital Signs: 


                                Last Vital Signs











Temp  36.7 C   11/10/20 03:50


 


Pulse  91   11/10/20 03:50


 


Resp  14   11/10/20 03:50


 


BP  128/76   11/10/20 03:50


 


Pulse Ox  99   11/10/20 03:50

## 2020-11-11 VITALS — HEART RATE: 76 BPM | SYSTOLIC BLOOD PRESSURE: 118 MMHG | DIASTOLIC BLOOD PRESSURE: 68 MMHG

## 2020-11-11 RX ADMIN — OXYCODONE HYDROCHLORIDE AND ACETAMINOPHEN PRN TAB: 5; 325 TABLET ORAL at 09:34

## 2020-11-11 RX ADMIN — OXYCODONE HYDROCHLORIDE AND ACETAMINOPHEN PRN TAB: 5; 325 TABLET ORAL at 05:20

## 2020-11-11 NOTE — PCM.PNPP
- General Info


Date of Service: 20


Functional Status: Reports: Pain Controlled, Tolerating Diet, Ambulating, 

Urinating





- Review of Systems


General: Reports: No Symptoms


Pulmonary: Reports: No Symptoms


Cardiovascular: Reports: No Symptoms


Gastrointestinal: Reports: Abdominal Pain (managed with medications )


Genitourinary: Reports: No Symptoms


Musculoskeletal: Reports: No Symptoms


Neurological: Reports: No Symptoms





- Patient Data


Vital Signs - Most Recent: 


                                Last Vital Signs











Temp  36.1 C   20 03:27


 


Pulse  83   20 03:27


 


Resp  16   20 03:27


 


BP  133/73   20 03:27


 


Pulse Ox  100   20 03:27











Weight - Most Recent: 82.1 kg


I&O - Last 24 Hours: 


                                 Intake & Output











 11/10/20 11/10/20 11/11/20





 14:59 22:59 06:59


 


Intake Total 180  


 


Output Total 300  


 


Balance -120  











Lab Results - Last 24 Hours: 


                         Laboratory Results - last 24 hr











  11/10/20 Range/Units





  07:28 


 


WBC  10.33 H  (3.98-10.04)  K/mm3


 


RBC  4.17  (3.98-5.22)  M/mm3


 


Hgb  10.3 L D  (11.2-15.7)  gm/dl


 


Hct  32.4 L  (34.1-44.9)  %


 


MCV  77.7 L  (79.4-94.8)  fl


 


MCH  24.7 L  (25.6-32.2)  pg


 


MCHC  31.8 L  (32.2-35.5)  g/dl


 


RDW Std Deviation  40.3  (36.4-46.3)  fL


 


Plt Count  256  (182-369)  K/mm3


 


MPV  12.1  (9.4-12.3)  fl











Med Orders - Current: 


                               Current Medications





Diphenhydramine HCl (Benadryl)  25 mg IVPUSH Q6H PRN


   PRN Reason: Itching or Nausea


Docusate Sodium (Colace)  100 mg PO Q12H PRN


   PRN Reason: Constipation


   Last Admin: 11/10/20 22:04 Dose:  100 mg


   Documented by: 


Ibuprofen (Motrin)  600 mg PO Q6H PRN


   PRN Reason: mild pain or fever


   Last Admin: 20 00:49 Dose:  600 mg


   Documented by: 


Ondansetron HCl (Zofran)  4 mg IV Q8H PRN


   PRN Reason: Nausea/Vomiting


   Last Admin: 20 11:52 Dose:  4 mg


   Documented by: 


Oxycodone/Acetaminophen (Percocet 325-5 Mg)  1 tab PO Q4H PRN


   PRN Reason: Pain (moderate 4-6)


   Last Admin: 11/10/20 10:21 Dose:  1 tab


   Documented by: 


Oxycodone/Acetaminophen (Percocet 325-5 Mg)  2 tab PO Q4H PRN


   PRN Reason: Pain (severe 7-10)


   Last Admin: 20 05:20 Dose:  2 tab


   Documented by: 


Simethicone (Simethicone)  160 mg PO Q4H PRN


   PRN Reason: gas distention


   Last Admin: 11/10/20 22:04 Dose:  160 mg


   Documented by: 





Discontinued Medications





Bupivacaine HCl (Sensorcaine-Mpf 0.25%)  10 ml .ROUTE .STK-MED ONE


   Stop: 20 08:01


Cefazolin Sodium (Ancef) Confirm Administered Dose 2 gm .ROUTE .STK-MED ONE


   Stop: 20 05:52


Citric Acid/Sodium Citrate (Bicitra Solution)  30 ml PO ONETIME ONE


   Stop: 20 05:17


   Last Admin: 20 05:27 Dose:  30 ml


   Documented by: 


Citric Acid/Sodium Citrate (Bicitra Solution) Confirm Administered Dose 30 ml 

.ROUTE .STK-MED ONE


   Stop: 20 05:22


   Last Admin: 20 05:27 Dose:  Not Given


   Documented by: 


Diphenhydramine HCl (Benadryl)  25 mg IVPUSH Q6H PRN


   PRN Reason: pruritis


Ephedrine Sulfate (Ephedrine Sulfate)  5 mg IVPUSH ASDIRECTED PRN


   PRN Reason: Hypotension


Fentanyl (Sublimaze)  100 mcg EPIDUR Q3H PRN


   PRN Reason: Pain


   Last Admin: 20 03:29 Dose:  100 mcg


   Documented by: 


Fentanyl (Sublimaze) Confirm Administered Dose 100 mcg .ROUTE .STK-MED ONE


   Stop: 20 02:19


   Last Admin: 20 03:29 Dose:  Not Given


   Documented by: 


Fentanyl/Bupivacaine HCl (Fentanyl/Bupivacaine/Ns 2 Mcg-0.125% 100 Ml)  100 ml 

EPIDUR ASDIRECTED PRN


   PRN Reason: Pain


   Last Admin: 20 03:29 Dose:  100 ml


   Documented by: 


Ampicillin Sodium 2 gm/ Sodium (Chloride)  100 mls @ 200 mls/hr IV ONETIME ONE


   Stop: 20 23:29


   Last Admin: 20 23:19 Dose:  200 mls/hr


   Documented by: 


Ampicillin Sodium 1 gm/ Sodium (Chloride)  100 mls @ 200 mls/hr IV Q4H [facility]


   Last Admin: 11/10/20 07:39 Dose:  Not Given


   Documented by: 


Oxytocin/Lactated Ringer's (Pitocin In Lr 10 Units/1,000 Ml)  10 unit in 1,000 

mls @ 100 mls/hr IV .CONTINUOUS [facility]


Lactated Ringer's (Ringers, Lactated)  1,000 mls @ 100 mls/hr IV ASDIRECTED [facility]


   Last Admin: 20 05:05 Dose:  100 mls/hr


   Documented by: 


Oxytocin/Lactated Ringer's (Pitocin In Lr 10 Units/1,000 Ml)  10 unit in 1,000 

mls @ 12 mls/hr IV TITRATE [facility]; Protocol


   Last Titration: 20 01:17 Dose:  0 munits/min, 0 mls/hr


   Documented by: 


Cefazolin Sodium/Dextrose 2 gm (/ Premix)  50 mls @ 100 mls/hr IV ONETIME ONE


   Stop: 20 05:45


   Last Admin: 11/10/20 07:39 Dose:  Not Given


   Documented by: 


Azithromycin 500 mg/ Sodium (Chloride)  250 mls @ 250 mls/hr IV ONETIME ONE


   Stop: 20 06:15


   Last Admin: 20 05:30 Dose:  250 mls/hr


   Documented by: 


Lactated Ringer's (Ringers, Lactated) Confirm Administered Dose 1,000 mls @ as 

directed .ROUTE .STK-MED ONE


   Stop: 20 05:38


Dextrose/Lactated Ringer's (Dextrose 5%-Lactated Ringers)  1,000 mls @ 125 

mls/hr IV ASDIRECTED [facility]


   Stop: 20 16:40


   Last Admin: 20 09:20 Dose:  125 mls/hr


   Documented by: 


Ketorolac Tromethamine (Toradol) Confirm Administered Dose 30 mg .ROUTE .STK-MED

ONE


   Stop: 20 06:11


Ketorolac Tromethamine (Toradol)  30 mg IVPUSH Q6H [facility]


   Stop: 11/10/20 00:31


   Last Admin: 11/10/20 01:48 Dose:  30 mg


   Documented by: 


Lidocaine/Epinephrine (Xylocaine-Mpf 2%-Epi 1:200,000) Confirm Administered Dose

20 ml .ROUTE .STK-MED ONE


   Stop: 20 05:37


Metoclopramide HCl (Reglan)  10 mg IVPUSH ONETIME ONE


   Stop: 20 05:17


   Last Admin: 20 05:27 Dose:  10 mg


   Documented by: 


Metoclopramide HCl (Reglan) Confirm Administered Dose 10 mg .ROUTE .STK-MED ONE


   Stop: 20 05:22


   Last Admin: 20 05:27 Dose:  Not Given


   Documented by: 


Miscellaneous Medication (Phenylephrine 1 Mg/10 Ml-Ns) Confirm Administered Dose

1 mg .ROUTE .STK-MED ONE


   Stop: 20 06:07


Misoprostol (Cytotec)  25 mcg VAG Q4H PRN


   PRN Reason: cervical ripening


   Last Admin: 20 16:33 Dose:  25 mcg


   Documented by: 


Misoprostol (Cytotec) Confirm Administered Dose 25 mcg .ROUTE .STK-MED ONE


   Stop: 20 19:42


   Last Admin: 20 23:18 Dose:  Not Given


   Documented by: 


Morphine Sulfate (Duramorph Pf) Confirm Administered Dose 10 mg .ROUTE .STK-MED 

ONE


   Stop: 20 06:13


Nalbuphine HCl (Nubain)  10 mg IVPUSH Q2H PRN


   PRN Reason: Pain


Ondansetron HCl (Zofran)  4 mg IVPUSH Q4H PRN


   PRN Reason: Nausea/Vomiting


Oxytocin (Pitocin) Confirm Administered Dose 10 unit .ROUTE .STK-MED ONE


   Stop: 20 06:04


Sodium Chloride (Saline Flush)  10 ml FLUSH ASDIRECTED PRN


   PRN Reason: Keep Vein Open


   Last Admin: 20 18:30 Dose:  10 ml


   Documented by: 











- Infant Interaction


Infant Disposition, Postpartum: [place] in Room with Family


Infant Interaction: Other (see below)


Infant Feeding:  Infant; Nursed Well


Support Person: Other (see below)





- Postpartum Recovery Exam


Fundal Tone: Firm


Fundal Level: 1 Fingerbreadths Below Umbilicus


Fundal Placement: Midline


Lochia Amount: Small


Lochia Color: Rubra/Red


Perineum Description: Intact, Minimal Bruising/Swelling


Episiotomy/Laceration: None


Bladder Status: Voiding


Urinary Elimination: Voided





- Exam


General: Alert, Oriented, Cooperative


Lungs: Clear to Auscultation, Normal Respiratory Effort


Cardiovascular: Regular Rate, Regular Rhythm


GI/Abdominal Exam: Soft, Non-Tender


Extremities: Normal Inspection


Skin: Warm, Dry, Intact


Wound/Incisions: Healing Well, No Drainage





- Problem List & Annotations


(1) 39 weeks gestation of pregnancy


SNOMED Code(s): 79912048


   Code(s): Z3A.39 - 39 WEEKS GESTATION OF PREGNANCY   Status: Acute   Current 

Visit: Yes   





(2) Sickle cell trait


SNOMED Code(s): 73514134


   Code(s): D57.3 - SICKLE-CELL TRAIT   Status: Acute   Current Visit: Yes   





(3) GBS (group B Streptococcus carrier), +RV culture, currently pregnant


SNOMED Code(s): 6639871767073, 616940151, 8452107398262


   Code(s): O99.820 - STREPTOCOCCUS B CARRIER STATE COMPLICATING PREGNANCY   

Status: Acute   Current Visit: Yes   





(4) Fetal pyelectasis


SNOMED Code(s): 502281274


   Code(s): CCO7082 -    Status: Acute   Current Visit: Yes   





(5) Non-reassuring fetal status


SNOMED Code(s): 241449648


   Code(s): JJL2075 -    Status: Acute   Current Visit: Yes   





(6) S/P primary low transverse 


SNOMED Code(s): 113505686, 92255538, 309154938, 675198331, 411687280


   Code(s): Z98.891 - HISTORY OF UTERINE SCAR FROM PREVIOUS SURGERY   Status: 

Acute   Current Visit: Yes   





- Problem List Review


Problem List Initiated/Reviewed/Updated: Yes





- My Orders


Last 24 Hours: 


My Active Orders





11/10/20 06:30


Ibuprofen [Motrin]   600 mg PO Q6H PRN 





11/10/20 14:47


Simethicone   160 mg PO Q4H PRN 





20 06:54


Ready for Discharge [RC] PER UNIT ROUTINE 














- Assessment


Assessment:: 





POD#2





- Plan


Plan:: 





* Routine care


* Breast feeding


* Discharge home today

## 2020-11-11 NOTE — PCM.DCSUM1
**Discharge Summary





- Discharge Data


Discharge Date: 20


Discharge Disposition: Home, Self-Care 01


Condition: Good





- Referral to Home Health


Primary Care Physician: 


Adamaris Ham MD








- Discharge Diagnosis/Problem(s)


(1) 39 weeks gestation of pregnancy


SNOMED Code(s): 95129474


   ICD Code: Z3A.39 - 39 WEEKS GESTATION OF PREGNANCY   Status: Acute   Current 

Visit: Yes   





(2) Sickle cell trait


SNOMED Code(s): 58542804


   ICD Code: D57.3 - SICKLE-CELL TRAIT   Status: Acute   Current Visit: Yes   





(3) GBS (group B Streptococcus carrier), +RV culture, currently pregnant


SNOMED Code(s): 1579002269545, 364676395, 0441185133054


   ICD Code: O99.820 - STREPTOCOCCUS B CARRIER STATE COMPLICATING PREGNANCY   S

tatus: Acute   Current Visit: Yes   





(4) Fetal pyelectasis


SNOMED Code(s): 064831242


   ICD Code: EVU1923 -    Status: Acute   Current Visit: Yes   





(5) Non-reassuring fetal status


SNOMED Code(s): 921753591


   ICD Code: [id number] -    Status: Acute   Current Visit: Yes   





(6) S/P primary low transverse 


SNOMED Code(s): 644188827, 00246856, 768139708, 125821011, 453357775


   ICD Code: Z98.891 - HISTORY OF UTERINE SCAR FROM PREVIOUS SURGERY   Status: 

Acute   Current Visit: Yes   





- Patient Summary/Data


Operative Procedure(s) Performed: Primary low transverse 


Complications: None


Consults: 


None


Recommended Follow-up Testing/Procedures: 


Follow up in 3 weeks for postpartum check 


Hospital Course: 


patient is a 29-year-old  at 39-2/7 weeks gestation who presented for 

induction of labor.  Induction done with cytotec to start.  Did have SROM.  

After SROM no further augmentation done.  Unfortunately patient with recurrent 

late decelerations as labor progressed.  Not able to make these resolve with 

supportive interventions.  Did eventually proceed to PLTCS.  This was 

uncomplicated.  See operative note.  Postpartum she did well and was discharged 

home on PPD#2 





- Patient Instructions


Diet: Regular Diet as Tolerated


Activity: No Lifting Over 10 Pounds (6 weeks )


Activity, Other: Pelvic rest for 6 weeks 


Driving: Do Not Drive (While taking pain medications )


Showering/Bathing: May Shower, No Tub Bathing/Swimming


Wound/Incision Care: Keep Operative Site/Wound Site Clean and Dry


Notify Provider of: Fever, Increased Pain, Swelling and Redness, Drainage, 

Nausea and/or Vomiting





- Discharge Plan


*PRESCRIPTION DRUG MONITORING PROGRAM REVIEWED*: No


*COPY OF PRESCRIPTION DRUG MONITORING REPORT IN PATIENT EMILI: No


Prescriptions/Med Rec: 


Acetaminophen/oxyCODONE [Percocet 325-5 MG] 1 - 2 tab PO Q4H PRN #25 tablet


 PRN Reason: Pain (Severe 7-10)


Home Medications: 


                                    Home Meds





Albuterol [IJP: Albuterol] 2.5 mg .XX Q4HR #25 unit 18 [Rx]


Budesonide/Formoterol [Symbicort 160-4.5 MCG] 1 puff INH DAILY 20 

[History]


Montelukast [Singulair] 10 mg PO DAILY 20 [History]


Prenatal #103/Iron Fumarate/Fa [ Prenatal] 1 tab PO DAILY 20 

[History]


Acetaminophen/oxyCODONE [Percocet 325-5 MG] 1 - 2 tab PO Q4H PRN #25 tablet 

11/10/20 [Rx]


Docusate Sodium [Colace] 100 mg PO Q12H PRN  cap 11/10/20 [Rx]


Ibuprofen [Motrin] 600 mg PO Q6H PRN  tablet 11/10/20 [Rx]








Patient Handouts:   Delivery, Care After, Postpartum Care After 

 Delivery


Referrals: 


Adamaris Ham MD [Primary Care Provider] -  (3 weeks for postpartum check )





- Discharge Summary/Plan Comment


DC Time >30 min.: No





- Patient Data


Vitals - Most Recent: 


                                Last Vital Signs











Temp  36.1 C   20 03:27


 


Pulse  83   20 03:27


 


Resp  16   20 03:27


 


BP  133/73   20 03:27


 


Pulse Ox  100   20 03:27











Weight - Most Recent: 82.1 kg


I&O - Last 24 hours: 


                                 Intake & Output











 11/10/20 11/10/20 11/11/20





 14:59 22:59 06:59


 


Intake Total 180  


 


Output Total 300  


 


Balance -120  











Lab Results - Last 24 hrs: 


                         Laboratory Results - last 24 hr











  11/10/20 Range/Units





  07:28 


 


WBC  10.33 H  (3.98-10.04)  K/mm3


 


RBC  4.17  (3.98-5.22)  M/mm3


 


Hgb  10.3 L D  (11.2-15.7)  gm/dl


 


Hct  32.4 L  (34.1-44.9)  %


 


MCV  77.7 L  (79.4-94.8)  fl


 


MCH  24.7 L  (25.6-32.2)  pg


 


MCHC  31.8 L  (32.2-35.5)  g/dl


 


RDW Std Deviation  40.3  (36.4-46.3)  fL


 


Plt Count  256  (182-369)  K/mm3


 


MPV  12.1  (9.4-12.3)  fl











Med Orders - Current: 


                               Current Medications





Diphenhydramine HCl (Benadryl)  25 mg IVPUSH Q6H PRN


   PRN Reason: Itching or Nausea


Docusate Sodium (Colace)  100 mg PO Q12H PRN


   PRN Reason: Constipation


   Last Admin: 11/10/20 22:04 Dose:  100 mg


   Documented by: 


Ibuprofen (Motrin)  600 mg PO Q6H PRN


   PRN Reason: mild pain or fever


   Last Admin: 20 00:49 Dose:  600 mg


   Documented by: 


Ondansetron HCl (Zofran)  4 mg IV Q8H PRN


   PRN Reason: Nausea/Vomiting


   Last Admin: 20 11:52 Dose:  4 mg


   Documented by: 


Oxycodone/Acetaminophen (Percocet 325-5 Mg)  1 tab PO Q4H PRN


   PRN Reason: Pain (moderate 4-6)


   Last Admin: 11/10/20 10:21 Dose:  1 tab


   Documented by: 


Oxycodone/Acetaminophen (Percocet 325-5 Mg)  2 tab PO Q4H PRN


   PRN Reason: Pain (severe 7-10)


   Last Admin: 20 05:20 Dose:  2 tab


   Documented by: 


Simethicone (Simethicone)  160 mg PO Q4H PRN


   PRN Reason: gas distention


   Last Admin: 11/10/20 22:04 Dose:  160 mg


   Documented by: 





Discontinued Medications





Bupivacaine HCl (Sensorcaine-Mpf 0.25%)  10 ml .ROUTE .STK-MED ONE


   Stop: 20 08:01


Cefazolin Sodium (Ancef) Confirm Administered Dose 2 gm .ROUTE .ST-MED ONE


   Stop: 20 05:52


Citric Acid/Sodium Citrate (Bicitra Solution)  30 ml PO ONETIME ONE


   Stop: 20 05:17


   Last Admin: 20 05:27 Dose:  30 ml


   Documented by: 


Citric Acid/Sodium Citrate (Bicitra Solution) Confirm Administered Dose 30 ml 

.ROUTE .[facility]-MED ONE


   Stop: 20 05:22


   Last Admin: 20 05:27 Dose:  Not Given


   Documented by: 


Diphenhydramine HCl (Benadryl)  25 mg IVPUSH Q6H PRN


   PRN Reason: pruritis


Ephedrine Sulfate (Ephedrine Sulfate)  5 mg IVPUSH ASDIRECTED PRN


   PRN Reason: Hypotension


Fentanyl (Sublimaze)  100 mcg EPIDUR Q3H PRN


   PRN Reason: Pain


   Last Admin: 20 03:29 Dose:  100 mcg


   Documented by: 


Fentanyl (Sublimaze) Confirm Administered Dose 100 mcg .ROUTE .[facility]-MED ONE


   Stop: 20 02:19


   Last Admin: 20 03:29 Dose:  Not Given


   Documented by: 


Fentanyl/Bupivacaine HCl (Fentanyl/Bupivacaine/Ns 2 Mcg-0.125% 100 Ml)  100 ml 

EPIDUR ASDIRECTED PRN


   PRN Reason: Pain


   Last Admin: 20 03:29 Dose:  100 ml


   Documented by: 


Ampicillin Sodium 2 gm/ Sodium (Chloride)  100 mls @ 200 mls/hr IV ONETIME ONE


   Stop: 20 23:29


   Last Admin: 20 23:19 Dose:  200 mls/hr


   Documented by: 


Ampicillin Sodium 1 gm/ Sodium (Chloride)  100 mls @ 200 mls/hr IV Q4H [facility]


   Last Admin: 11/10/20 07:39 Dose:  Not Given


   Documented by: 


Oxytocin/Lactated Ringer's (Pitocin In Lr 10 Units/1,000 Ml)  10 unit in 1,000 

mls @ 100 mls/hr IV .CONTINUOUS SPENCER


Lactated Ringer's (Ringers, Lactated)  1,000 mls @ 100 mls/hr IV ASDIRECTED [facility]


   Last Admin: 20 05:05 Dose:  100 mls/hr


   Documented by: 


Oxytocin/Lactated Ringer's (Pitocin In Lr 10 Units/1,000 Ml)  10 unit in 1,000 

mls @ 12 mls/hr IV TITRATE [facility]; Protocol


   Last Titration: 20 01:17 Dose:  0 munits/min, 0 mls/hr


   Documented by: 


Cefazolin Sodium/Dextrose 2 gm (/ Premix)  50 mls @ 100 mls/hr IV ONETIME ONE


   Stop: 20 05:45


   Last Admin: 11/10/20 07:39 Dose:  Not Given


   Documented by: 


Azithromycin 500 mg/ Sodium (Chloride)  250 mls @ 250 mls/hr IV ONETIME ONE


   Stop: 20 06:15


   Last Admin: 20 05:30 Dose:  250 mls/hr


   Documented by: 


Lactated Ringer's (Ringers, Lactated) Confirm Administered Dose 1,000 mls @ as 

directed .ROUTE .STK-MED ONE


   Stop: 20 05:38


Dextrose/Lactated Ringer's (Dextrose 5%-Lactated Ringers)  1,000 mls @ 125 

mls/hr IV ASDIRECTED [facility]


   Stop: 20 16:40


   Last Admin: 20 09:20 Dose:  125 mls/hr


   Documented by: 


Ketorolac Tromethamine (Toradol) Confirm Administered Dose 30 mg .ROUTE .STK-MED

ONE


   Stop: 20 06:11


Ketorolac Tromethamine (Toradol)  30 mg IVPUSH Q6H [facility]


   Stop: 11/10/20 00:31


   Last Admin: 11/10/20 01:48 Dose:  30 mg


   Documented by: 


Lidocaine/Epinephrine (Xylocaine-Mpf 2%-Epi 1:200,000) Confirm Administered Dose

20 ml .ROUTE .STK-MED ONE


   Stop: 20 05:37


Metoclopramide HCl (Reglan)  10 mg IVPUSH ONETIME ONE


   Stop: 20 05:17


   Last Admin: 20 05:27 Dose:  10 mg


   Documented by: 


Metoclopramide HCl (Reglan) Confirm Administered Dose 10 mg .ROUTE .STK-MED ONE


   Stop: 20 05:22


   Last Admin: 20 05:27 Dose:  Not Given


   Documented by: 


Miscellaneous Medication (Phenylephrine 1 Mg/10 Ml-Ns) Confirm Administered Dose

1 mg .ROUTE .STK-MED ONE


   Stop: 20 06:07


Misoprostol (Cytotec)  25 mcg VAG Q4H PRN


   PRN Reason: cervical ripening


   Last Admin: 20 16:33 Dose:  25 mcg


   Documented by: 


Misoprostol (Cytotec) Confirm Administered Dose 25 mcg .ROUTE .STK-MED ONE


   Stop: 20 19:42


   Last Admin: 20 23:18 Dose:  Not Given


   Documented by: 


Morphine Sulfate (Duramorph Pf) Confirm Administered Dose 10 mg .ROUTE .STK-MED 

ONE


   Stop: 20 06:13


Nalbuphine HCl (Nubain)  10 mg IVPUSH Q2H PRN


   PRN Reason: Pain


Ondansetron HCl (Zofran)  4 mg IVPUSH Q4H PRN


   PRN Reason: Nausea/Vomiting


Oxytocin (Pitocin) Confirm Administered Dose 10 unit .ROUTE .STK-MED ONE


   Stop: 20 06:04


Sodium Chloride (Saline Flush)  10 ml FLUSH ASDIRECTED PRN


   PRN Reason: Keep Vein Open


   Last Admin: 20 18:30 Dose:  10 ml


   Documented by:

## 2022-09-22 ENCOUNTER — HOSPITAL ENCOUNTER (INPATIENT)
Dept: HOSPITAL 41 - JD.OB | Age: 32
LOS: 2 days | Discharge: HOME | DRG: 540 | End: 2022-09-24
Attending: OBSTETRICS & GYNECOLOGY | Admitting: OBSTETRICS & GYNECOLOGY
Payer: COMMERCIAL

## 2022-09-22 DIAGNOSIS — K21.9: ICD-10-CM

## 2022-09-22 DIAGNOSIS — Z3A.39: ICD-10-CM

## 2022-09-22 DIAGNOSIS — O34.211: Primary | ICD-10-CM

## 2022-09-22 RX ADMIN — KETOROLAC TROMETHAMINE SCH MG: 30 INJECTION, SOLUTION INTRAMUSCULAR at 13:58

## 2022-09-22 RX ADMIN — KETOROLAC TROMETHAMINE SCH MG: 30 INJECTION, SOLUTION INTRAMUSCULAR at 20:10

## 2022-09-23 RX ADMIN — OXYCODONE HYDROCHLORIDE AND ACETAMINOPHEN PRN TAB: 5; 325 TABLET ORAL at 18:31

## 2022-09-23 RX ADMIN — OXYCODONE HYDROCHLORIDE AND ACETAMINOPHEN PRN TAB: 5; 325 TABLET ORAL at 20:21

## 2022-09-23 RX ADMIN — KETOROLAC TROMETHAMINE SCH MG: 30 INJECTION, SOLUTION INTRAMUSCULAR at 01:56

## 2022-09-24 VITALS — SYSTOLIC BLOOD PRESSURE: 134 MMHG | DIASTOLIC BLOOD PRESSURE: 78 MMHG | HEART RATE: 88 BPM

## 2022-09-24 RX ADMIN — OXYCODONE HYDROCHLORIDE AND ACETAMINOPHEN PRN TAB: 5; 325 TABLET ORAL at 11:47

## 2022-09-24 RX ADMIN — OXYCODONE HYDROCHLORIDE AND ACETAMINOPHEN PRN TAB: 5; 325 TABLET ORAL at 08:25

## 2022-09-24 RX ADMIN — OXYCODONE HYDROCHLORIDE AND ACETAMINOPHEN PRN TAB: 5; 325 TABLET ORAL at 02:45

## 2022-09-24 RX ADMIN — OXYCODONE HYDROCHLORIDE AND ACETAMINOPHEN PRN TAB: 5; 325 TABLET ORAL at 00:31

## 2022-09-24 RX ADMIN — OXYCODONE HYDROCHLORIDE AND ACETAMINOPHEN PRN TAB: 5; 325 TABLET ORAL at 07:17
